# Patient Record
Sex: MALE | Race: BLACK OR AFRICAN AMERICAN | Employment: FULL TIME | URBAN - METROPOLITAN AREA
[De-identification: names, ages, dates, MRNs, and addresses within clinical notes are randomized per-mention and may not be internally consistent; named-entity substitution may affect disease eponyms.]

---

## 2023-11-21 ENCOUNTER — HOSPITAL ENCOUNTER (OUTPATIENT)
Facility: HOSPITAL | Age: 23
Setting detail: HOSPITAL OUTPATIENT SURGERY
Discharge: HOME | End: 2023-11-21
Attending: ORTHOPAEDIC SURGERY | Admitting: ORTHOPAEDIC SURGERY
Payer: OTHER MISCELLANEOUS

## 2023-11-21 ENCOUNTER — ANESTHESIA (OUTPATIENT)
Dept: SURGERY | Facility: HOSPITAL | Age: 23
End: 2023-11-21
Payer: OTHER MISCELLANEOUS

## 2023-11-21 ENCOUNTER — ANESTHESIA EVENT (OUTPATIENT)
Dept: SURGERY | Facility: HOSPITAL | Age: 23
End: 2023-11-21
Payer: OTHER MISCELLANEOUS

## 2023-11-21 VITALS
HEIGHT: 69 IN | DIASTOLIC BLOOD PRESSURE: 91 MMHG | TEMPERATURE: 98 F | OXYGEN SATURATION: 96 % | WEIGHT: 274 LBS | RESPIRATION RATE: 14 BRPM | HEART RATE: 95 BPM | BODY MASS INDEX: 40.58 KG/M2 | SYSTOLIC BLOOD PRESSURE: 143 MMHG

## 2023-11-21 PROBLEM — M25.311 INSTABILITY OF RIGHT SHOULDER JOINT: Status: ACTIVE | Noted: 2023-11-21

## 2023-11-21 PROCEDURE — 0LM14ZZ REATTACHMENT OF RIGHT SHOULDER TENDON, PERCUTANEOUS ENDOSCOPIC APPROACH: ICD-10-PCS | Performed by: ORTHOPAEDIC SURGERY

## 2023-11-21 PROCEDURE — 64415 NJX AA&/STRD BRCH PLXS IMG: CPT | Performed by: ORTHOPAEDIC SURGERY

## 2023-11-21 PROCEDURE — 0RNJ4ZZ RELEASE RIGHT SHOULDER JOINT, PERCUTANEOUS ENDOSCOPIC APPROACH: ICD-10-PCS | Performed by: ORTHOPAEDIC SURGERY

## 2023-11-21 PROCEDURE — 0LN14ZZ RELEASE RIGHT SHOULDER TENDON, PERCUTANEOUS ENDOSCOPIC APPROACH: ICD-10-PCS | Performed by: ORTHOPAEDIC SURGERY

## 2023-11-21 DEVICE — SUTURE ANCHOR, BIO- COMPOSITE SUTURETAK
Type: IMPLANTABLE DEVICE | Site: SHOULDER | Status: FUNCTIONAL
Brand: ARTHREX®

## 2023-11-21 RX ORDER — MORPHINE SULFATE 10 MG/ML
6 INJECTION, SOLUTION INTRAMUSCULAR; INTRAVENOUS EVERY 10 MIN PRN
Status: DISCONTINUED | OUTPATIENT
Start: 2023-11-21 | End: 2023-11-21

## 2023-11-21 RX ORDER — MIDAZOLAM HYDROCHLORIDE 1 MG/ML
INJECTION INTRAMUSCULAR; INTRAVENOUS
Status: COMPLETED | OUTPATIENT
Start: 2023-11-21 | End: 2023-11-21

## 2023-11-21 RX ORDER — SODIUM CHLORIDE, SODIUM LACTATE, POTASSIUM CHLORIDE, CALCIUM CHLORIDE 600; 310; 30; 20 MG/100ML; MG/100ML; MG/100ML; MG/100ML
INJECTION, SOLUTION INTRAVENOUS CONTINUOUS
Status: DISCONTINUED | OUTPATIENT
Start: 2023-11-21 | End: 2023-11-21

## 2023-11-21 RX ORDER — FAMOTIDINE 10 MG/ML
20 INJECTION, SOLUTION INTRAVENOUS ONCE
Status: COMPLETED | OUTPATIENT
Start: 2023-11-21 | End: 2023-11-21

## 2023-11-21 RX ORDER — LIDOCAINE HYDROCHLORIDE 10 MG/ML
INJECTION, SOLUTION EPIDURAL; INFILTRATION; INTRACAUDAL; PERINEURAL AS NEEDED
Status: DISCONTINUED | OUTPATIENT
Start: 2023-11-21 | End: 2023-11-21 | Stop reason: SURG

## 2023-11-21 RX ORDER — METOCLOPRAMIDE HYDROCHLORIDE 5 MG/ML
10 INJECTION INTRAMUSCULAR; INTRAVENOUS ONCE
Status: COMPLETED | OUTPATIENT
Start: 2023-11-21 | End: 2023-11-21

## 2023-11-21 RX ORDER — HYDROMORPHONE HYDROCHLORIDE 1 MG/ML
0.4 INJECTION, SOLUTION INTRAMUSCULAR; INTRAVENOUS; SUBCUTANEOUS EVERY 5 MIN PRN
Status: DISCONTINUED | OUTPATIENT
Start: 2023-11-21 | End: 2023-11-21

## 2023-11-21 RX ORDER — DEXAMETHASONE SODIUM PHOSPHATE 10 MG/ML
INJECTION, SOLUTION INTRAMUSCULAR; INTRAVENOUS
Status: COMPLETED | OUTPATIENT
Start: 2023-11-21 | End: 2023-11-21

## 2023-11-21 RX ORDER — HYDROMORPHONE HYDROCHLORIDE 1 MG/ML
0.2 INJECTION, SOLUTION INTRAMUSCULAR; INTRAVENOUS; SUBCUTANEOUS EVERY 5 MIN PRN
Status: DISCONTINUED | OUTPATIENT
Start: 2023-11-21 | End: 2023-11-21

## 2023-11-21 RX ORDER — ACETAMINOPHEN 500 MG
1000 TABLET ORAL ONCE AS NEEDED
Status: DISCONTINUED | OUTPATIENT
Start: 2023-11-21 | End: 2023-11-21

## 2023-11-21 RX ORDER — ONDANSETRON 2 MG/ML
4 INJECTION INTRAMUSCULAR; INTRAVENOUS EVERY 6 HOURS PRN
Status: DISCONTINUED | OUTPATIENT
Start: 2023-11-21 | End: 2023-11-21

## 2023-11-21 RX ORDER — PROCHLORPERAZINE EDISYLATE 5 MG/ML
5 INJECTION INTRAMUSCULAR; INTRAVENOUS EVERY 8 HOURS PRN
Status: DISCONTINUED | OUTPATIENT
Start: 2023-11-21 | End: 2023-11-21

## 2023-11-21 RX ORDER — HYDROCODONE BITARTRATE AND ACETAMINOPHEN 5; 325 MG/1; MG/1
2 TABLET ORAL ONCE AS NEEDED
Status: DISCONTINUED | OUTPATIENT
Start: 2023-11-21 | End: 2023-11-21

## 2023-11-21 RX ORDER — CEFAZOLIN SODIUM IN 0.9 % NACL 3 G/100 ML
INTRAVENOUS SOLUTION, PIGGYBACK (ML) INTRAVENOUS AS NEEDED
Status: DISCONTINUED | OUTPATIENT
Start: 2023-11-21 | End: 2023-11-21 | Stop reason: SURG

## 2023-11-21 RX ORDER — ONDANSETRON 2 MG/ML
INJECTION INTRAMUSCULAR; INTRAVENOUS AS NEEDED
Status: DISCONTINUED | OUTPATIENT
Start: 2023-11-21 | End: 2023-11-21 | Stop reason: SURG

## 2023-11-21 RX ORDER — HYDROMORPHONE HYDROCHLORIDE 1 MG/ML
0.6 INJECTION, SOLUTION INTRAMUSCULAR; INTRAVENOUS; SUBCUTANEOUS EVERY 5 MIN PRN
Status: DISCONTINUED | OUTPATIENT
Start: 2023-11-21 | End: 2023-11-21

## 2023-11-21 RX ORDER — DEXAMETHASONE SODIUM PHOSPHATE 4 MG/ML
VIAL (ML) INJECTION AS NEEDED
Status: DISCONTINUED | OUTPATIENT
Start: 2023-11-21 | End: 2023-11-21 | Stop reason: SURG

## 2023-11-21 RX ORDER — FAMOTIDINE 20 MG/1
20 TABLET, FILM COATED ORAL ONCE
Status: COMPLETED | OUTPATIENT
Start: 2023-11-21 | End: 2023-11-21

## 2023-11-21 RX ORDER — ROPIVACAINE HYDROCHLORIDE 5 MG/ML
INJECTION, SOLUTION EPIDURAL; INFILTRATION; PERINEURAL
Status: COMPLETED | OUTPATIENT
Start: 2023-11-21 | End: 2023-11-21

## 2023-11-21 RX ORDER — HYDROCODONE BITARTRATE AND ACETAMINOPHEN 5; 325 MG/1; MG/1
1 TABLET ORAL ONCE AS NEEDED
Status: DISCONTINUED | OUTPATIENT
Start: 2023-11-21 | End: 2023-11-21

## 2023-11-21 RX ORDER — ACETAMINOPHEN 500 MG
1000 TABLET ORAL ONCE
Status: COMPLETED | OUTPATIENT
Start: 2023-11-21 | End: 2023-11-21

## 2023-11-21 RX ORDER — CEFAZOLIN SODIUM/WATER 2 G/20 ML
2 SYRINGE (ML) INTRAVENOUS ONCE
Status: DISCONTINUED | OUTPATIENT
Start: 2023-11-21 | End: 2023-11-21 | Stop reason: HOSPADM

## 2023-11-21 RX ORDER — NALOXONE HYDROCHLORIDE 0.4 MG/ML
80 INJECTION, SOLUTION INTRAMUSCULAR; INTRAVENOUS; SUBCUTANEOUS AS NEEDED
Status: DISCONTINUED | OUTPATIENT
Start: 2023-11-21 | End: 2023-11-21

## 2023-11-21 RX ORDER — METOCLOPRAMIDE 10 MG/1
10 TABLET ORAL ONCE
Status: COMPLETED | OUTPATIENT
Start: 2023-11-21 | End: 2023-11-21

## 2023-11-21 RX ORDER — MORPHINE SULFATE 4 MG/ML
4 INJECTION, SOLUTION INTRAMUSCULAR; INTRAVENOUS EVERY 10 MIN PRN
Status: DISCONTINUED | OUTPATIENT
Start: 2023-11-21 | End: 2023-11-21

## 2023-11-21 RX ORDER — MORPHINE SULFATE 4 MG/ML
2 INJECTION, SOLUTION INTRAMUSCULAR; INTRAVENOUS EVERY 10 MIN PRN
Status: DISCONTINUED | OUTPATIENT
Start: 2023-11-21 | End: 2023-11-21

## 2023-11-21 RX ADMIN — DEXAMETHASONE SODIUM PHOSPHATE 10 MG: 10 INJECTION, SOLUTION INTRAMUSCULAR; INTRAVENOUS at 14:52:00

## 2023-11-21 RX ADMIN — MIDAZOLAM HYDROCHLORIDE 2 MG: 1 INJECTION INTRAMUSCULAR; INTRAVENOUS at 14:52:00

## 2023-11-21 RX ADMIN — ONDANSETRON 4 MG: 2 INJECTION INTRAMUSCULAR; INTRAVENOUS at 16:09:00

## 2023-11-21 RX ADMIN — CEFAZOLIN SODIUM IN 0.9 % NACL 3 G: 3 G/100 ML INTRAVENOUS SOLUTION, PIGGYBACK (ML) INTRAVENOUS at 16:30:00

## 2023-11-21 RX ADMIN — DEXAMETHASONE SODIUM PHOSPHATE 4 MG: 4 MG/ML VIAL (ML) INJECTION at 16:09:00

## 2023-11-21 RX ADMIN — ROPIVACAINE HYDROCHLORIDE 30 ML: 5 INJECTION, SOLUTION EPIDURAL; INFILTRATION; PERINEURAL at 14:52:00

## 2023-11-21 RX ADMIN — SODIUM CHLORIDE, SODIUM LACTATE, POTASSIUM CHLORIDE, CALCIUM CHLORIDE: 600; 310; 30; 20 INJECTION, SOLUTION INTRAVENOUS at 16:57:00

## 2023-11-21 RX ADMIN — LIDOCAINE HYDROCHLORIDE 50 MG: 10 INJECTION, SOLUTION EPIDURAL; INFILTRATION; INTRACAUDAL; PERINEURAL at 16:09:00

## 2023-11-21 NOTE — ANESTHESIA PROCEDURE NOTES
Peripheral Block    Date/Time: 11/21/2023 2:52 PM    Performed by: Fiordaliza Camarena MD  Authorized by: Fiordaliza Camarena MD      General Information and Staff    Start Time:  11/21/2023 2:52 PM  End Time:  11/21/2023 2:56 PM  Anesthesiologist:  Fiordaliza Camarena MD  Performed by: Anesthesiologist  Patient Location:  PACU    Block Placement: Pre Induction  Site Identification: real time ultrasound guided and image stored and retrievable    Block site/laterality marked before start: site marked  Reason for Block: at surgeon's request and post-op pain management    Preanesthetic Checklist: 2 patient identifers, IV checked, risks and benefits discussed, monitors and equipment checked, pre-op evaluation, timeout performed, anesthesia consent and sterile technique used      Procedure Details    Patient Position:  Sitting  Prep: ChloraPrep    Monitoring:  Cardiac monitor, continuous pulse ox, blood pressure cuff and heart rate  Block Type:   Interscalene  Laterality:  Right  Injection Technique:  Single-shot    Needle    Needle Type:  Short-bevel  Needle Gauge:  22 G  Needle Length:  50 mm  Needle Localization:  Ultrasound guidance  Reason for Ultrasound Use: appropriate spread of the medication was noted in real time and no ultrasound evidence of intravascular and/or intraneural injection            Assessment    Injection Assessment:  Good spread noted, incremental injection, local visualized surrounding nerve on ultrasound, low pressure, negative aspiration for heme and no pain on injection  Heart Rate Change: No        Medications  11/21/2023 2:52 PM  dexamethasone (DECADRON) PF injection 10 mg/ml - Injection   10 mg - 11/21/2023 2:52:00 PM  ropivacaine (NAROPIN) injection 0.5% - Infiltration   30 mL - 11/21/2023 2:52:00 PM  midazolam (VERSED)injection 2mg/2ml - Intravenous   2 mg - 11/21/2023 2:52:00 PM    Additional Comments

## 2023-11-21 NOTE — ANESTHESIA PROCEDURE NOTES
Airway  Date/Time: 11/21/2023 4:13 PM  Urgency: Elective    Airway not difficult    General Information and Staff    Patient location during procedure: OR  Anesthesiologist: Pee Cifuentes MD  Resident/CRNA: Yudi Hightower CRNA  Performed: CRNA   Performed by: Yudi Hightower CRNA  Authorized by: Pee Cifuentes MD      Indications and Patient Condition  Indications for airway management: anesthesia  Spontaneous ventilation: present  Sedation level: deep  Preoxygenated: yes  Patient position: sniffing  Mask difficulty assessment: 1 - vent by mask  No planned trial extubation    Final Airway Details  Final airway type: endotracheal airway      Successful airway: ETT  Cuffed: yes   Successful intubation technique: direct laryngoscopy  Endotracheal tube insertion site: oral  Blade: Rodolfo  Blade size: #4  ETT size (mm): 7.5    Cormack-Lehane Classification: grade I - full view of glottis  Placement verified by: capnometry   Measured from: teeth  ETT to teeth (cm): 22  Number of attempts at approach: 1    Additional Comments  Atrauamatic.  Bite block in place

## 2023-11-21 NOTE — BRIEF OP NOTE
Pre-Operative Diagnosis: R shoulder bankart tear     Post-Operative Diagnosis: R shoulder bankart tear, R shoulder secondary impingement. Procedure Performed:   Right shoulder arthroscopic bankart repair  Right shoulder arthroscopic subacromial decompression.       Surgeon(s) and Role:     Pratibha Padilla MD - Primary    Assistant(s):  Surgical Assistant.: Brittney Whelan     Surgical Findings: R bankart lesion and secondary impingement     Specimen: None     Estimated Blood Loss: No data recorded    Dictation Number:  TBD    Monica Pearce MD  11/21/2023  5:37 PM

## 2023-12-27 NOTE — OPERATIVE REPORT
[facility]    PATIENT'S NAME: Carlitos Burns   ATTENDING PHYSICIAN: Delaney Paitner. Ruslan Damon MD   OPERATING PHYSICIAN: Delaney Painter. Ruslan Damon MD   PATIENT ACCOUNT#:   [id number]    LOCATION:  [facility]  MEDICAL RECORD #:   [id number]       YOB: 2000  ADMISSION DATE:       11/21/2023      OPERATION DATE:  11/21/2023    OPERATIVE REPORT      PREOPERATIVE DIAGNOSIS:  Right shoulder Bankart tear. POSTOPERATIVE DIAGNOSIS:    1. Right shoulder Bankart tear with corresponding Hill-Sachs lesion. 2.   Right shoulder secondary impingement. PROCEDURE:    1. Right shoulder arthroscopic Bankart repair. 2.   Right shoulder arthroscopic subacromial decompression. ASSISTANT:  JOHNNY Myers. ANESTHESIA:  Interscalene block with general.    ESTIMATED BLOOD LOSS:  Minimal.    COMPLICATIONS:  None. INDICATIONS:  The patient is a 82-year-old male who injured his right shoulder at work. He sustained a right shoulder dislocation. An MRI demonstrated a Bankart lesion as well as a corresponding Hill-Sachs lesion. He had persistent instability. After discussion of treatment options, he wished to proceed with right shoulder arthroscopic Bankart repair. Risks including, but not limited to, bleeding, infection, persistent pain, persistent instability, arthrofibrosis, adhesive capsulitis, DVT and PE were discussed. The patient understood these risks and wished to proceed with the procedure as stated above. OPERATIVE TECHNIQUE:  The patient was met in the preoperative holding area. Both the patient and I confirmed that the right shoulder was the operative site and the right shoulder was marked accordingly. IV antibiotics were administered to the patient for prophylactic purposes. An interscalene block was obtained by the anesthesiologist without difficulty. The patient was brought into the operating room, placed on the operating table in supine position.   General anesthesia was obtained by the anesthesiologist without difficulty. The patient was repositioned into the lateral decubitus position on a Gelfoam padded pegboard. An axillary roll was placed underneath the chest wall. All extremities were well padded. The right upper extremity was prepped and draped in the usual sterile fashion. Intraoperative time-out was performed with the operating room staff, and the right shoulder was confirmed to be the operative site. An exam under anesthesia was performed. There was 2+ anterior instability. There was no evidence of a posterior instability. Next, a standard posterior portal was established. Diagnostic arthroscopy was initiated. Inspection of the glenohumeral joint demonstrated no chondral defects at the glenoid surfaces. There was a posterior Hill-Sachs lesion. There is a Bankart lesion. The biceps anchor as well as the biceps tendon were intact. There was no evidence of a rotator cuff tear. There was a positive drive-through sign. Next, 2 standard anterior portals were established. We elevated the capsular labral tissue off the anterior inferior glenoid. This was healed inferiorly and nonanatomically. Once we were able to easily mobilize the glenoid, we debrided the glenoid neck until there was a bleeding bony surface present. We then inserted an anterior inferior anchor. It was a 3 mm Bio-SutureTak. Capsular labral tissue was passed from inferior to superior using a suture Lasso. Arthroscopic knot-tying technique was utilized. Once this was completed, there was a significant restoration of the capsule labral structures with nice anterior bumper. Next, we inserted 2 additional anchors 5 mm superiorly. Again, using a suture Lasso, we passed the sutures to the capsular labral tissue from inferior to superior fashion. Arthroscopic knot-tying technique was utilized.   Once this was completed, there was complete restoration of the anterior bumper with restoration of the capsule labral tissue on to the glenoid. The positive drive-through sign was obliterated. Next, the arthroscope was introduced into the subacromial space. Upon entering the subacromial space, there was significant bursitis present. Using an arthroscopic shaver, we performed an arthroscopic bursectomy and soft tissue decompression. Next, the right shoulder was copiously irrigated. Portal sites were closed using 4-0 nylon in interrupted fashion. A sterile dressing was applied to the right shoulder as well as a sling to the right upper extremity. The patient tolerated the procedure without difficulty and was brought to recovery room in stable condition. Please note that [employer] was present for the critical portions of the case to help decrease operating room time. Dictated By Naz Goodman MD  d: 12/26/2023 17:34:48  t: 12/26/2023 22:32:57  [facility] [id number]  [facility]/

## (undated) DEVICE — BUR SHV L13CM DIA4MM 8 FLUT OVL FOR RAP AGG

## (undated) DEVICE — CANNULA ARTHSCP L7CM ID5.75MM CRYS W/ OBT

## (undated) DEVICE — GAMMEX® PI HYBRID SIZE 8, STERILE POWDER-FREE SURGICAL GLOVE, POLYISOPRENE AND NEOPRENE BLEND: Brand: GAMMEX

## (undated) DEVICE — CANNULA ARTHSCP L7CM ID7MM TRNSLUC THRD FLX

## (undated) DEVICE — GAMMEX® PI HYBRID SIZE 7, STERILE POWDER-FREE SURGICAL GLOVE, POLYISOPRENE AND NEOPRENE BLEND: Brand: GAMMEX

## (undated) DEVICE — 60 ML SYRINGE LUER-LOCK TIP: Brand: MONOJECT

## (undated) DEVICE — BLADE SHV L13CM DIA4MM DBL CUT COOLCUT

## (undated) DEVICE — SOLUTION IRRIG 3000ML 0.9% NACL FLX CONT

## (undated) DEVICE — GAMMEX® PI HYBRID SIZE 7.5, STERILE POWDER-FREE SURGICAL GLOVE, POLYISOPRENE AND NEOPRENE BLEND: Brand: GAMMEX

## (undated) DEVICE — MEDI-VAC NON-CONDUCTIVE SUCTION TUBING: Brand: CARDINAL HEALTH

## (undated) DEVICE — TUBING PMP L16FT DISP

## (undated) DEVICE — EXPRESSEW III SUTURE NEEDLE FOR USE WITH EXPRESSEW II OR III SUTURE PASSER: Brand: EXPRESSEW

## (undated) DEVICE — PAD,ABDOMINAL,8"X7.5",STERILE,LF,1/PK: Brand: MEDLINE

## (undated) DEVICE — Device

## (undated) DEVICE — AMBIENT SUPER TURBOVAC 90 IFS: Brand: COBLATION

## (undated) DEVICE — SHOULDER ARTHROSCOPY: Brand: MEDLINE INDUSTRIES, INC.

## (undated) DEVICE — SUTURE ETHILON 4-0 18IN FS2 BK

## (undated) DEVICE — SLEEVE CMPR VLCR STRL

## (undated) DEVICE — SUTURE ETHLN SZ 3-0 L30IN NONABSORB BLK

## (undated) DEVICE — 3M™ MICROFOAM™ TAPE 1528-4: Brand: 3M™ MICROFOAM™

## (undated) DEVICE — ENCORE® LATEX MICRO SIZE 7.5, STERILE LATEX POWDER-FREE SURGICAL GLOVE: Brand: ENCORE

## (undated) DEVICE — BLADE SHV L13CM DIA4MM EXCALIBUR AGG COOLCUT